# Patient Record
(demographics unavailable — no encounter records)

---

## 2025-06-25 NOTE — PHYSICAL EXAM
[General Appearance - Alert] : alert [General Appearance - In No Acute Distress] : in no acute distress [Sclera] : the sclera and conjunctiva were normal [Extraocular Movements] : extraocular movements were intact [] : no rash [Impaired Insight] : insight and judgment were intact [Mood] : the mood was normal [FreeTextEntry1] : No synovitis or effusion on exam noted today

## 2025-06-25 NOTE — HISTORY OF PRESENT ILLNESS
[FreeTextEntry1] : Verbal consent given for telehealth video visit on 6/25/25 by patient, via [facility] with visit done from my [facility]phone at [address] [place] [address] and patient on his phone in [place].  36 y/o M for follow up w/ hx of gout w/ MSU crystals on ankle tap. He had multiple gout attacks in his ankles with well over 3 attacks when off his gout medications in the past.  Today he states he is taking Allopurinol 400 mg daily that he is tolerating well.  States he did labs on med to review in detail.  States no gout attack now and doing well.  He states he had gout attack a few months ago in Lt ankle that improved w/ Mitigare when he was off the Allopurinol.  Denies any swelling or redness or warmth of any joints at this time. Denies any kidney stone. States he came off HTN meds w/ PCP. He has hx of proteinuria and told to see Renal. He has hx of low titer +SKYE 1:80 speckled on repeat SKYE w/ IF negative; DS-DNA neg; C3/C4 WNL; ANCA negative in the past. Denies any fever/chills, no rashes, no ulcers, no dry eyes, no dry mouth, no raynaud's, no GI/ symptoms at this time. Denies any nosebleeds, no hemoptysis.  He has subclinical hypothyroidism on labs in the past. Denies any cold intolerance or constipation. States he has hx of wt gain to monitor w/ PCP please.

## 2025-06-25 NOTE — ASSESSMENT
[FreeTextEntry1] : 36 y/o w/ hx of Gout w/ gout attacks in ankles w/ tap of ankle confirming MSU crystals w/ well over 3 attacks of gout.  Gout: -today he states he is taking Allopurinol 400 mg daily that he is tolerating well and did labs on it -reviewed labs 6/23/25 w/ uric acid=6.5; CBC ok; CMP w/ creat=1.43 (from 1.53) w/ GFR=65 (told to stay hydrated and was given Renal referral to monitor there also); CBC ok; 6/28/24 random urine prot/creat ratio=1.5 (from 4.4; told to see Renal please); high TSH (told to monitor w/ PCP please); vit D normal -discussed r/b/s of increasing Allopurinol 400 mg daily to Allopurinol 500 mg daily for goal uric acid < 6 w/ pt agreeable and prescription sent as below -labs as below to monitor on it for f/u -has mitigare/colchicine to keep at home in case of gout attack -has mitigare/colchicine 0.6 mg 1 tab PO Q 12 hr PRN & cut down if diarrhea from it; to take if gout attack and told to alert us if attack w/ pt agreeable -knows to avoid alcohol/beer, red meat, shellfish/shrimps.  Leukocytosis: Resolved on labs 1/26/24 -states he has some URI symptoms when WBC was high in the past  Transaminitis: Resolved on labs 2/7/23 -asymptomatic -discussed to avoid alcohol or tylenol or too much NSAIDs  Proteinuria: random urine pro/creat ratio= 1.5 (from 4.4 from 2.7 from 1.4 from 0.8) on labs 6/28/24 -told to please see Renal to evaluate -reports was on HTN meds in the past -SKYE w/ IF negative, DS-DNA neg; C3/C4 WNL; ANCA negative on labs 9/10/2020 reviewed  SKYE 1:80 speckled: low titer + in the past: repeat SKYE w/ IF negative -Clinically without much symptoms of CTD/lupus/sjogren syndrome at this time and educated on symptoms to monitor for in detail if he evolves. -lab as below w/ disease activity markers normal w/ SKYE w/ IF, DS-DNA neg; C3/C4 WNL on labs 9/10/2020 reviewed -had + thyroid ab as that could have caused cross reactivity  Subclinical hypothyroidism: high TSH on labs 6/28/24 and denies much symptoms and told to monitor w/ PCP please  -educated on symptoms to monitor for in detail and alert us if any concerns. -knows to stay up to date on health maintenance w/ PCP -f/u in 6-8 weeks w/ labs on Allopurinol please or sooner as needed.